# Patient Record
Sex: FEMALE | Race: WHITE | NOT HISPANIC OR LATINO | Employment: OTHER | URBAN - METROPOLITAN AREA
[De-identification: names, ages, dates, MRNs, and addresses within clinical notes are randomized per-mention and may not be internally consistent; named-entity substitution may affect disease eponyms.]

---

## 2017-04-20 ENCOUNTER — OFFICE VISIT (OUTPATIENT)
Dept: RETAIL CLINIC | Facility: CLINIC | Age: 82
End: 2017-04-20

## 2017-04-20 VITALS
SYSTOLIC BLOOD PRESSURE: 196 MMHG | RESPIRATION RATE: 18 BRPM | DIASTOLIC BLOOD PRESSURE: 90 MMHG | OXYGEN SATURATION: 91 % | HEART RATE: 93 BPM | TEMPERATURE: 98.4 F

## 2017-04-20 DIAGNOSIS — I10 ESSENTIAL HYPERTENSION: Primary | ICD-10-CM

## 2017-04-20 PROCEDURE — 99213 OFFICE O/P EST LOW 20 MIN: CPT | Performed by: NURSE PRACTITIONER

## 2017-04-20 RX ORDER — AMLODIPINE BESYLATE 10 MG/1
10 TABLET ORAL DAILY
Qty: 30 TABLET | Refills: 0 | Status: SHIPPED | OUTPATIENT
Start: 2017-04-20 | End: 2017-05-10

## 2017-04-20 RX ORDER — ASPIRIN 81 MG/1
81 TABLET, CHEWABLE ORAL DAILY
COMMUNITY

## 2017-04-20 NOTE — PROGRESS NOTES
Subjective:     Milvia Wilkinson is a 82 y.o.     Hypertension   This is a new problem. The current episode started more than 1 year ago. Associated symptoms include malaise/fatigue and shortness of breath. Pertinent negatives include no peripheral edema. Chest pain: at times, had last night resolved at this time. Past treatments include calcium channel blockers. Hypertensive end-organ damage includes angina and kidney disease. cardiomegally. Identifiable causes of hypertension include chronic renal disease.         The following portions of the patient's history were reviewed and updated as appropriate: allergies, current medications, past family history, past medical history, past social history, past surgical history and problem list.      Review of Systems   Constitutional: Positive for fatigue and malaise/fatigue. Negative for appetite change (decreased).   Respiratory: Positive for shortness of breath. Chest tightness: not now but has at times.    Cardiovascular: Negative for leg swelling. Chest pain: at times, had last night resolved at this time.   Gastrointestinal: Negative for diarrhea, nausea and vomiting.   Musculoskeletal: Negative for myalgias.   Skin: Negative for color change, pallor and rash.   Neurological: Positive for dizziness and light-headedness.         Objective:      Physical Exam   Constitutional: She appears well-developed and well-nourished. She is cooperative.   HENT:   Head: Normocephalic and atraumatic.   Eyes: EOM and lids are normal. Pupils are equal, round, and reactive to light.   Neck: Normal range of motion. Neck supple.   Cardiovascular: Normal rate and regular rhythm.    Murmur heard.  Pulmonary/Chest: Effort normal. She has decreased breath sounds.   Abdominal: Soft. Normal appearance and bowel sounds are normal. There is no tenderness.   Musculoskeletal: Normal range of motion.   Neurological: She is alert.   A & O times two, generalized weakness noted, gait unsteady   Skin:  Skin is warm, dry and intact.   Psychiatric: She has a normal mood and affect. Her speech is normal and behavior is normal.   Vitals reviewed.          Milvia was seen today for hypertension.    Diagnoses and all orders for this visit:    Essential hypertension    Other orders  -     amLODIPine (NORVASC) 10 MG tablet; Take 1 tablet by mouth Daily for 30 days.    Patient and daughter advised to take patient to the emergency room based on chest pain that she had last night and current hypertension. Patient refused, daughter encouraged her to go but patient still refused. Patient has appointment with a new PCP in August. Advised patient and daughter to call PCP for earlier appointment based on findings at this clinic today. Patient has also been advised to see a cardiologist and to take an 81mg aspirin daily. Start antihypertensive ordered at this visit ASAP. She had been on both amlodipine and losarten in the past but was non compliant with her medications for some time now. I chose to start her on the amlodipine related to the need for a baseline bun/creat and patient's advanced kidney disease as evidenced by 02/2017 labs. Both the patient and her daughter have verbalized understanding and agreed to this POC. Patient and daughter are strongly encouraged to proceed immediately to the ER if chest pain returns.

## 2017-04-20 NOTE — PATIENT INSTRUCTIONS

## 2017-04-21 ENCOUNTER — TELEPHONE (OUTPATIENT)
Dept: CARDIOLOGY | Facility: CLINIC | Age: 82
End: 2017-04-21

## 2017-04-21 NOTE — TELEPHONE ENCOUNTER
04/21/17  Milvia Wilkinson  1934    Home Phone [phone]   Mobile [phone]     Guilherme calls to make an appt for her mother who she took to an urgent care last night; /90 and she was advised to go to ER. Guilherme stated she would prefer to have her mother evaluated by PCP. She has had some recent episodes of chest pain that occur with rest and with activity. She is currently chest pain free.     I offered her the first available new patient appt: Dr. Rose on 4/26 at 2:30. Guilherme requests to have her mother placed on waiting list to be seen sooner. I advised to follow-up with PCP for hypertension. Guilherme states that mother used to see Dr. Casa Ding but she didn't like him and stopped going. She is looking for new PCP.    I advised to go to ER or any chest pain unrelieved by rest or BP >200/100.    Carole ORTIZ RN

## 2017-04-26 ENCOUNTER — OFFICE VISIT (OUTPATIENT)
Dept: CARDIOLOGY | Facility: CLINIC | Age: 82
End: 2017-04-26

## 2017-04-26 VITALS
DIASTOLIC BLOOD PRESSURE: 60 MMHG | HEIGHT: 61 IN | SYSTOLIC BLOOD PRESSURE: 130 MMHG | WEIGHT: 103 LBS | BODY MASS INDEX: 19.45 KG/M2 | HEART RATE: 89 BPM

## 2017-04-26 DIAGNOSIS — I42.9 CARDIOMYOPATHY (HCC): ICD-10-CM

## 2017-04-26 DIAGNOSIS — I49.3 PREMATURE VENTRICULAR CONTRACTIONS: ICD-10-CM

## 2017-04-26 DIAGNOSIS — I15.1 HYPERTENSION SECONDARY TO OTHER RENAL DISORDERS: Primary | ICD-10-CM

## 2017-04-26 DIAGNOSIS — I42.8 OTHER CARDIOMYOPATHIES (HCC): ICD-10-CM

## 2017-04-26 DIAGNOSIS — J44.9 CHRONIC OBSTRUCTIVE PULMONARY DISEASE, UNSPECIFIED COPD TYPE (HCC): ICD-10-CM

## 2017-04-26 DIAGNOSIS — N18.4 CHRONIC KIDNEY DISEASE, STAGE IV (SEVERE) (HCC): ICD-10-CM

## 2017-04-26 DIAGNOSIS — N28.89 HYPERTENSION SECONDARY TO OTHER RENAL DISORDERS: Primary | ICD-10-CM

## 2017-04-26 PROCEDURE — 93000 ELECTROCARDIOGRAM COMPLETE: CPT | Performed by: INTERNAL MEDICINE

## 2017-04-26 PROCEDURE — 99204 OFFICE O/P NEW MOD 45 MIN: CPT | Performed by: INTERNAL MEDICINE

## 2017-04-26 NOTE — PROGRESS NOTES
Date of Office Visit: 2017  Encounter Provider: Jaspreet Rose MD  Place of Service: [facility] CARDIOLOGY  Patient Name: Milvia Wilkinson  :1934  Orquidea Contreras MD    Chief Complaint   Patient presents with   • Hypertension     History of Present Illness  The patient is an 82-year-old female with a long-standing history of hypertensive heart disease as well as a nonischemic cardiomyopathy.  Her last known ejection fraction was approximately 45%.  This was an echocardiogram that was done in .  The patient was noted to have left ventricular hypertrophy which was moderate and also diastolic dysfunction.  She had previously been on amlodipine and intermittently will discontinue the medication on around.  The last time she discontinued as her blood pressure went up to well over 200 systolic.  She was seen in urgent care and restarted on the medication if her blood pressure has improved.    Symptomatically she does complain intermittently of chest pain and shortness of breath as well as peripheral edema and palpitations.    The patient has stage IV renal failure.  She sees Dr. Carreno for her follow-up for her renal dysfunction.  According to the patient he did not know she was not on her antihypertensive medication.      Past Medical History:   Diagnosis Date   • Anxiety    • Cardiomegaly    • Cardiomyopathy    • Chronic kidney disease, stage IV (severe)    • COPD (chronic obstructive pulmonary disease)    • Degeneration of lumbar intervertebral disc    • Emphysema of lung    • Emphysema of lung    • Generalized anxiety disorder    • History of complete eye exam 2015    Dr Misael Montano - [facility]   • Hypertension    • Insomnia          Past Surgical History:   Procedure Laterality Date   • APPENDECTOMY     • COLONOSCOPY      dr oliveros   • HYSTERECTOMY     • MAMMO BILATERAL      declines   • PAP SMEAR      declines   • TONSILLECTOMY             Current Outpatient  "Prescriptions:   •  amLODIPine (NORVASC) 10 MG tablet, Take 1 tablet by mouth Daily for 30 days., Disp: 30 tablet, Rfl: 0  •  aspirin 81 MG chewable tablet, Chew 81 mg Daily., Disp: , Rfl:       Social History     Social History   • Marital status:      Spouse name: N/A   • Number of children: N/A   • Years of education: N/A     Occupational History   • Not on file.     Social History Main Topics   • Smoking status: Former Smoker   • Smokeless tobacco: Never Used   • Alcohol use Not on file   • Drug use: No   • Sexual activity: Not Currently     Other Topics Concern   • Not on file     Social History Narrative         Review of Systems   Constitution: Positive for malaise/fatigue.   HENT: Negative.    Eyes: Negative.    Cardiovascular: Positive for dyspnea on exertion and leg swelling.   Respiratory: Negative.    Endocrine: Negative.    Skin: Negative.    Musculoskeletal: Negative.    Gastrointestinal: Negative.    Neurological: Negative.    Psychiatric/Behavioral: Negative.        Procedures      ECG 12 Lead  Date/Time: 4/26/2017 3:30 PM  Performed by: DELFINA GONZALEZ  Authorized by: DELFINA GONZALEZ   Comparison: compared with previous ECG from 5/2/2016  Similar to previous ECG  Rhythm: sinus rhythm  Ectopy: unifocal PVCs  Rate: normal  Conduction: left bundle branch block and non-specific intraventricular conduction delay  Comments: Biatrial enlargement               Objective:    /60  Pulse 89  Ht 61\" (154.9 cm)  Wt 103 lb (46.7 kg)  BMI 19.46 kg/m2        Physical Exam   Constitutional: She is oriented to person, place, and time. She appears well-developed and well-nourished.   HENT:   Head: Normocephalic.   Eyes: Pupils are equal, round, and reactive to light.   Neck: Normal range of motion. No JVD present. Carotid bruit is not present. No thyromegaly present.   Cardiovascular: Normal rate, regular rhythm, S1 normal, S2 normal and intact distal pulses.  Exam reveals gallop and S4. Exam " reveals no friction rub.    No murmur heard.  Pulses:       Carotid pulses are 2+ on the right side, and 2+ on the left side.       Radial pulses are 2+ on the right side, and 2+ on the left side.        Femoral pulses are 2+ on the right side, and 2+ on the left side.       Popliteal pulses are 2+ on the right side, and 2+ on the left side.        Dorsalis pedis pulses are 2+ on the right side, and 2+ on the left side.        Posterior tibial pulses are 2+ on the right side, and 2+ on the left side.   Pulmonary/Chest: Effort normal and breath sounds normal.   Abdominal: Soft. Bowel sounds are normal.   Musculoskeletal: She exhibits no edema.   Neurological: She is alert and oriented to person, place, and time.   Skin: Skin is warm, dry and intact. No erythema.   Psychiatric: She has a normal mood and affect.   Vitals reviewed.          Assessment:       Diagnosis Plan   1. Hypertension secondary to other renal disorders     2. Cardiomyopathy  Adult Transthoracic Echo Complete   3. Chronic obstructive pulmonary disease, unspecified COPD type     4. Chronic kidney disease, stage IV (severe)     5. Other cardiomyopathies   Adult Transthoracic Echo Complete   6. Premature ventricular contractions              Plan:     Going to continue her present medication and review the echocardiogram.

## 2017-05-04 ENCOUNTER — HOSPITAL ENCOUNTER (OUTPATIENT)
Dept: CARDIOLOGY | Facility: HOSPITAL | Age: 82
Discharge: HOME | End: 2017-05-04
Attending: INTERNAL MEDICINE | Admitting: INTERNAL MEDICINE

## 2017-05-04 VITALS
SYSTOLIC BLOOD PRESSURE: 138 MMHG | HEIGHT: 61 IN | WEIGHT: 103 LBS | HEART RATE: 81 BPM | DIASTOLIC BLOOD PRESSURE: 72 MMHG | BODY MASS INDEX: 19.45 KG/M2

## 2017-05-04 DIAGNOSIS — I42.8 OTHER CARDIOMYOPATHIES (HCC): ICD-10-CM

## 2017-05-04 DIAGNOSIS — I42.9 CARDIOMYOPATHY (HCC): ICD-10-CM

## 2017-05-04 LAB
ASCENDING AORTA: 3 CM
BH CV ECHO MEAS - ACS: 2 CM
BH CV ECHO MEAS - AO MAX PG (FULL): 1.3 MMHG
BH CV ECHO MEAS - AO MAX PG: 8.8 MMHG
BH CV ECHO MEAS - AO MEAN PG (FULL): 0.15 MMHG
BH CV ECHO MEAS - AO MEAN PG: 4 MMHG
BH CV ECHO MEAS - AO ROOT AREA (BSA CORRECTED): 1.9
BH CV ECHO MEAS - AO ROOT AREA: 5.5 CM^2
BH CV ECHO MEAS - AO ROOT DIAM: 2.7 CM
BH CV ECHO MEAS - AO V2 MAX: 148.4 CM/SEC
BH CV ECHO MEAS - AO V2 MEAN: 94.2 CM/SEC
BH CV ECHO MEAS - AO V2 VTI: 24.9 CM
BH CV ECHO MEAS - AVA(I,A): 2.4 CM^2
BH CV ECHO MEAS - AVA(I,D): 2.4 CM^2
BH CV ECHO MEAS - AVA(V,A): 2.3 CM^2
BH CV ECHO MEAS - AVA(V,D): 2.3 CM^2
BH CV ECHO MEAS - BSA(HAYCOCK): 1.4 M^2
BH CV ECHO MEAS - BSA: 1.4 M^2
BH CV ECHO MEAS - BZI_BMI: 19.5 KILOGRAMS/M^2
BH CV ECHO MEAS - BZI_METRIC_HEIGHT: 154.9 CM
BH CV ECHO MEAS - BZI_METRIC_WEIGHT: 46.7 KG
BH CV ECHO MEAS - CONTRAST EF (2CH): 44.6 ML/M^2
BH CV ECHO MEAS - CONTRAST EF 4CH: 44.7 ML/M^2
BH CV ECHO MEAS - EDV(MOD-SP2): 139 ML
BH CV ECHO MEAS - EDV(MOD-SP4): 123 ML
BH CV ECHO MEAS - EDV(TEICH): 95.4 ML
BH CV ECHO MEAS - EF(CUBED): 54.1 %
BH CV ECHO MEAS - EF(MOD-SP2): 44.6 %
BH CV ECHO MEAS - EF(MOD-SP4): 44.7 %
BH CV ECHO MEAS - EF(TEICH): 46 %
BH CV ECHO MEAS - ESV(MOD-SP2): 77 ML
BH CV ECHO MEAS - ESV(MOD-SP4): 68 ML
BH CV ECHO MEAS - ESV(TEICH): 51.5 ML
BH CV ECHO MEAS - FS: 22.9 %
BH CV ECHO MEAS - IVS/LVPW: 0.97
BH CV ECHO MEAS - IVSD: 1.3 CM
BH CV ECHO MEAS - LAT PEAK E' VEL: 14 CM/SEC
BH CV ECHO MEAS - LV DIASTOLIC VOL/BSA (35-75): 86.3 ML/M^2
BH CV ECHO MEAS - LV MASS(C)D: 222.4 GRAMS
BH CV ECHO MEAS - LV MASS(C)DI: 156.1 GRAMS/M^2
BH CV ECHO MEAS - LV MAX PG: 7.5 MMHG
BH CV ECHO MEAS - LV MEAN PG: 3.9 MMHG
BH CV ECHO MEAS - LV SYSTOLIC VOL/BSA (12-30): 47.7 ML/M^2
BH CV ECHO MEAS - LV V1 MAX: 136.6 CM/SEC
BH CV ECHO MEAS - LV V1 MEAN: 90.6 CM/SEC
BH CV ECHO MEAS - LV V1 VTI: 23.6 CM
BH CV ECHO MEAS - LVIDD: 4.6 CM
BH CV ECHO MEAS - LVIDS: 3.5 CM
BH CV ECHO MEAS - LVLD AP2: 9.2 CM
BH CV ECHO MEAS - LVLD AP4: 8.1 CM
BH CV ECHO MEAS - LVLS AP2: 7.7 CM
BH CV ECHO MEAS - LVLS AP4: 7.7 CM
BH CV ECHO MEAS - LVOT AREA (M): 2.5 CM^2
BH CV ECHO MEAS - LVOT AREA: 2.5 CM^2
BH CV ECHO MEAS - LVOT DIAM: 1.8 CM
BH CV ECHO MEAS - LVPWD: 1.3 CM
BH CV ECHO MEAS - MED PEAK E' VEL: 10 CM/SEC
BH CV ECHO MEAS - MR MAX PG: 96.6 MMHG
BH CV ECHO MEAS - MR MAX VEL: 491.5 CM/SEC
BH CV ECHO MEAS - MV DEC SLOPE: 1120 CM/SEC^2
BH CV ECHO MEAS - MV DEC TIME: 0.09 SEC
BH CV ECHO MEAS - MV E MAX VEL: 154.2 CM/SEC
BH CV ECHO MEAS - MV MAX PG: 16.6 MMHG
BH CV ECHO MEAS - MV MEAN PG: 6.3 MMHG
BH CV ECHO MEAS - MV P1/2T MAX VEL: 184.6 CM/SEC
BH CV ECHO MEAS - MV P1/2T: 48.3 MSEC
BH CV ECHO MEAS - MV V2 MAX: 203.8 CM/SEC
BH CV ECHO MEAS - MV V2 MEAN: 115.3 CM/SEC
BH CV ECHO MEAS - MV V2 VTI: 32.5 CM
BH CV ECHO MEAS - MVA P1/2T LCG: 1.2 CM^2
BH CV ECHO MEAS - MVA(P1/2T): 4.6 CM^2
BH CV ECHO MEAS - MVA(VTI): 1.8 CM^2
BH CV ECHO MEAS - PA ACC TIME: 0.08 SEC
BH CV ECHO MEAS - PA MAX PG (FULL): 1.9 MMHG
BH CV ECHO MEAS - PA MAX PG: 6.7 MMHG
BH CV ECHO MEAS - PA PR(ACCEL): 42.6 MMHG
BH CV ECHO MEAS - PA V2 MAX: 129 CM/SEC
BH CV ECHO MEAS - PULM A REVS DUR: 0.11 SEC
BH CV ECHO MEAS - PULM A REVS VEL: 30.8 CM/SEC
BH CV ECHO MEAS - PULM DIAS VEL: 39.1 CM/SEC
BH CV ECHO MEAS - PULM S/D: 1.7
BH CV ECHO MEAS - PULM SYS VEL: 67.4 CM/SEC
BH CV ECHO MEAS - PVA(V,A): 1.9 CM^2
BH CV ECHO MEAS - PVA(V,D): 1.9 CM^2
BH CV ECHO MEAS - QP/QS: 0.63
BH CV ECHO MEAS - RAP SYSTOLE: 3 MMHG
BH CV ECHO MEAS - RV MAX PG: 4.8 MMHG
BH CV ECHO MEAS - RV MEAN PG: 2.1 MMHG
BH CV ECHO MEAS - RV V1 MAX: 109.1 CM/SEC
BH CV ECHO MEAS - RV V1 MEAN: 69.2 CM/SEC
BH CV ECHO MEAS - RV V1 VTI: 16.7 CM
BH CV ECHO MEAS - RVOT AREA: 2.2 CM^2
BH CV ECHO MEAS - RVOT DIAM: 1.7 CM
BH CV ECHO MEAS - RVSP: 38.5 MMHG
BH CV ECHO MEAS - SI(AO): 96.5 ML/M^2
BH CV ECHO MEAS - SI(CUBED): 36 ML/M^2
BH CV ECHO MEAS - SI(LVOT): 41.5 ML/M^2
BH CV ECHO MEAS - SI(MOD-SP2): 43.5 ML/M^2
BH CV ECHO MEAS - SI(MOD-SP4): 38.6 ML/M^2
BH CV ECHO MEAS - SI(TEICH): 30.8 ML/M^2
BH CV ECHO MEAS - SUP REN AO DIAM: 1.6 CM
BH CV ECHO MEAS - SV(AO): 137.5 ML
BH CV ECHO MEAS - SV(CUBED): 51.3 ML
BH CV ECHO MEAS - SV(LVOT): 59.1 ML
BH CV ECHO MEAS - SV(MOD-SP2): 62 ML
BH CV ECHO MEAS - SV(MOD-SP4): 55 ML
BH CV ECHO MEAS - SV(RVOT): 37.1 ML
BH CV ECHO MEAS - SV(TEICH): 43.9 ML
BH CV ECHO MEAS - TAPSE (>1.6): 1.9 CM2
BH CV ECHO MEAS - TR MAX VEL: 298 CM/SEC
BH CV XLRA - RV BASE: 3.2 CM
BH CV XLRA - TDI S': 13 CM/SEC
E/E' RATIO: 14
LEFT ATRIUM VOLUME INDEX: 38 ML/M2
LEFT ATRIUM VOLUME: 53 CM3
SINUS: 2.9 CM
STJ: 3.4 CM

## 2017-05-04 PROCEDURE — 93306 TTE W/DOPPLER COMPLETE: CPT | Performed by: INTERNAL MEDICINE

## 2017-05-04 PROCEDURE — 93306 TTE W/DOPPLER COMPLETE: CPT

## 2017-05-10 ENCOUNTER — TELEPHONE (OUTPATIENT)
Dept: CARDIOLOGY | Facility: CLINIC | Age: 82
End: 2017-05-10

## 2017-05-10 RX ORDER — HYDRALAZINE HYDROCHLORIDE 10 MG/1
10 TABLET, FILM COATED ORAL 2 TIMES DAILY
Qty: 60 TABLET | Refills: 3 | Status: SHIPPED | OUTPATIENT
Start: 2017-05-10 | End: 2017-08-16

## 2017-08-16 PROBLEM — IMO0002 HYMENOPTERA STING: Status: ACTIVE | Noted: 2017-08-16
